# Patient Record
Sex: FEMALE | Race: WHITE | NOT HISPANIC OR LATINO | URBAN - METROPOLITAN AREA
[De-identification: names, ages, dates, MRNs, and addresses within clinical notes are randomized per-mention and may not be internally consistent; named-entity substitution may affect disease eponyms.]

---

## 2019-08-30 ENCOUNTER — OFFICE (OUTPATIENT)
Dept: URBAN - METROPOLITAN AREA CLINIC 33 | Facility: CLINIC | Age: 49
End: 2019-08-30

## 2019-08-30 VITALS
TEMPERATURE: 97.4 F | HEART RATE: 96 BPM | DIASTOLIC BLOOD PRESSURE: 91 MMHG | SYSTOLIC BLOOD PRESSURE: 112 MMHG | HEIGHT: 65 IN | WEIGHT: 122 LBS

## 2019-08-30 DIAGNOSIS — K59.09 OTHER CONSTIPATION: ICD-10-CM

## 2019-08-30 DIAGNOSIS — R10.31 RIGHT LOWER QUADRANT PAIN: ICD-10-CM

## 2019-08-30 DIAGNOSIS — R10.13 EPIGASTRIC PAIN: ICD-10-CM

## 2019-08-30 PROCEDURE — 99214 OFFICE O/P EST MOD 30 MIN: CPT

## 2019-08-30 RX ORDER — PANTOPRAZOLE SODIUM 40 MG/1
TABLET, DELAYED RELEASE ORAL
Qty: 30 | Refills: 5 | Status: COMPLETED
Start: 2019-08-30 | End: 2020-02-04

## 2019-08-30 NOTE — SERVICEHPINOTES
STEFANIA WATERMAN   is a   49  female who presents for abdominal pain. She has had chronic abd pain but states issues worsened last fall and progressed within the past month. She 2 types of pain: epigastric pain occurring postprandially, and RLQ pain that only occurs at night when lying down. Epigastric pain occurs after eating anything. No nausea (only with migraines). No dysphagia. EGD in 2015 with esophagitis, mild gastritis. No regular NSAIDs.BRRLQ pain is moreso "discomfort". She has issues with constipation--takes 2 stool softeners daily and sennakot regularly (every other day). No family hx of GI issues. She had positive celiac genetic testing in 2015 and has followed a strict GF diet. Colonoscopy 8/2016 with tortuous colon but bx neg for colitis. Denies blood in the stool. Chest CTA in May unremarkable.

## 2019-10-14 ENCOUNTER — OFFICE (OUTPATIENT)
Dept: URBAN - METROPOLITAN AREA CLINIC 33 | Facility: CLINIC | Age: 49
End: 2019-10-14

## 2019-10-14 VITALS
WEIGHT: 127.4 LBS | SYSTOLIC BLOOD PRESSURE: 116 MMHG | DIASTOLIC BLOOD PRESSURE: 94 MMHG | HEART RATE: 108 BPM | HEIGHT: 65 IN | TEMPERATURE: 97.3 F

## 2019-10-14 DIAGNOSIS — R10.13 EPIGASTRIC PAIN: ICD-10-CM

## 2019-10-14 DIAGNOSIS — R10.12 LEFT UPPER QUADRANT PAIN: ICD-10-CM

## 2019-10-14 DIAGNOSIS — R10.31 RIGHT LOWER QUADRANT PAIN: ICD-10-CM

## 2019-10-14 DIAGNOSIS — K59.09 OTHER CONSTIPATION: ICD-10-CM

## 2019-10-14 LAB
AMBIG ABBREV CMP14 DEFAULT: (no result)
AMYLASE: 85 U/L (ref 31–124)
CBC WITH DIFFERENTIAL/PLATELET: BASO (ABSOLUTE): 0.1 X10E3/UL (ref 0–0.2)
CBC WITH DIFFERENTIAL/PLATELET: BASOS: 1 %
CBC WITH DIFFERENTIAL/PLATELET: EOS (ABSOLUTE): 0.1 X10E3/UL (ref 0–0.4)
CBC WITH DIFFERENTIAL/PLATELET: EOS: 2 %
CBC WITH DIFFERENTIAL/PLATELET: HEMATOCRIT: 39.1 % (ref 34–46.6)
CBC WITH DIFFERENTIAL/PLATELET: HEMOGLOBIN: 12.8 G/DL (ref 11.1–15.9)
CBC WITH DIFFERENTIAL/PLATELET: IMMATURE GRANS (ABS): 0 X10E3/UL (ref 0–0.1)
CBC WITH DIFFERENTIAL/PLATELET: IMMATURE GRANULOCYTES: 0 %
CBC WITH DIFFERENTIAL/PLATELET: LYMPHS (ABSOLUTE): 1.5 X10E3/UL (ref 0.7–3.1)
CBC WITH DIFFERENTIAL/PLATELET: LYMPHS: 18 %
CBC WITH DIFFERENTIAL/PLATELET: MCH: 30.6 PG (ref 26.6–33)
CBC WITH DIFFERENTIAL/PLATELET: MCHC: 32.7 G/DL (ref 31.5–35.7)
CBC WITH DIFFERENTIAL/PLATELET: MCV: 94 FL (ref 79–97)
CBC WITH DIFFERENTIAL/PLATELET: MONOCYTES(ABSOLUTE): 0.5 X10E3/UL (ref 0.1–0.9)
CBC WITH DIFFERENTIAL/PLATELET: MONOCYTES: 6 %
CBC WITH DIFFERENTIAL/PLATELET: NEUTROPHILS (ABSOLUTE): 6.2 X10E3/UL (ref 1.4–7)
CBC WITH DIFFERENTIAL/PLATELET: NEUTROPHILS: 73 %
CBC WITH DIFFERENTIAL/PLATELET: PLATELETS: 202 X10E3/UL (ref 150–450)
CBC WITH DIFFERENTIAL/PLATELET: RBC: 4.18 X10E6/UL (ref 3.77–5.28)
CBC WITH DIFFERENTIAL/PLATELET: RDW: 13.5 % (ref 12.3–15.4)
CBC WITH DIFFERENTIAL/PLATELET: WBC: 8.4 X10E3/UL (ref 3.4–10.8)
COMP. METABOLIC PANEL (14): A/G RATIO: 1.7 (ref 1.2–2.2)
COMP. METABOLIC PANEL (14): ALBUMIN: 4 G/DL (ref 3.5–5.5)
COMP. METABOLIC PANEL (14): ALKALINE PHOSPHATASE: 47 IU/L (ref 39–117)
COMP. METABOLIC PANEL (14): ALT (SGPT): 8 IU/L (ref 0–32)
COMP. METABOLIC PANEL (14): AST (SGOT): 13 IU/L (ref 0–40)
COMP. METABOLIC PANEL (14): BILIRUBIN, TOTAL: <0.2 MG/DL
COMP. METABOLIC PANEL (14): BUN/CREATININE RATIO: 12 (ref 9–23)
COMP. METABOLIC PANEL (14): BUN: 12 MG/DL (ref 6–24)
COMP. METABOLIC PANEL (14): CALCIUM: 9.2 MG/DL (ref 8.7–10.2)
COMP. METABOLIC PANEL (14): CARBON DIOXIDE, TOTAL: 18 MMOL/L — LOW (ref 20–29)
COMP. METABOLIC PANEL (14): CHLORIDE: 105 MMOL/L (ref 96–106)
COMP. METABOLIC PANEL (14): CREATININE: 1.03 MG/DL — HIGH (ref 0.57–1)
COMP. METABOLIC PANEL (14): EGFR IF AFRICN AM: 75 ML/MIN/1.73 (ref 59–?)
COMP. METABOLIC PANEL (14): EGFR IF NONAFRICN AM: 65 ML/MIN/1.73 (ref 59–?)
COMP. METABOLIC PANEL (14): GLOBULIN, TOTAL: 2.4 G/DL (ref 1.5–4.5)
COMP. METABOLIC PANEL (14): GLUCOSE: 135 MG/DL — HIGH (ref 65–99)
COMP. METABOLIC PANEL (14): POTASSIUM: 4.1 MMOL/L (ref 3.5–5.2)
COMP. METABOLIC PANEL (14): PROTEIN, TOTAL: 6.4 G/DL (ref 6–8.5)
COMP. METABOLIC PANEL (14): SODIUM: 141 MMOL/L (ref 134–144)
LIPASE: 21 U/L (ref 14–72)

## 2019-10-14 PROCEDURE — 99215 OFFICE O/P EST HI 40 MIN: CPT

## 2019-10-14 RX ORDER — SUCRALFATE 1 G/1
TABLET ORAL
Qty: 90 | Refills: 5 | Status: COMPLETED
Start: 2019-10-14 | End: 2020-02-04

## 2019-10-14 NOTE — SERVICEHPINOTES
STEFANIA WATERMAN   is a   49  female who complains of ongoing abdominal pain.  She has had chronic abd pain but states issues worsened last fall and progressed within the few months. Since last OV, she states RLQ pain has worsened and is now severe. She is having issues sleeping at night due to the pain (was previously moreso a "discomfort". She also reports LUQ pain (previously was more epigastric). Both pains are unrelated to eating and BMs. She has been taking linzess 72mcg, usually every other day which works very well for her although has no impact on pain.  No regular NSAIDs. Chest CTA in May unremarkable. No recent abdominal imaging. No weight loss. She just recently lost her job. She has only been taking pantoprazole "occasionally" as she is concerned about SEs (kidney issues) with regular use. Colonoscopy 8/2016 with tortuous colon but bx neg for colitis EGD in 2015 with esophagitis, mild gastritis.BR

## 2020-02-04 ENCOUNTER — OFFICE (OUTPATIENT)
Dept: URBAN - METROPOLITAN AREA CLINIC 33 | Facility: CLINIC | Age: 50
End: 2020-02-04

## 2020-02-04 VITALS
WEIGHT: 129 LBS | HEART RATE: 98 BPM | TEMPERATURE: 97.7 F | DIASTOLIC BLOOD PRESSURE: 100 MMHG | HEIGHT: 65 IN | SYSTOLIC BLOOD PRESSURE: 125 MMHG

## 2020-02-04 DIAGNOSIS — R10.12 LEFT UPPER QUADRANT PAIN: ICD-10-CM

## 2020-02-04 DIAGNOSIS — K90.0 CELIAC DISEASE: ICD-10-CM

## 2020-02-04 DIAGNOSIS — R10.31 RIGHT LOWER QUADRANT PAIN: ICD-10-CM

## 2020-02-04 DIAGNOSIS — K59.09 OTHER CONSTIPATION: ICD-10-CM

## 2020-02-04 PROCEDURE — 99214 OFFICE O/P EST MOD 30 MIN: CPT

## 2020-02-04 RX ORDER — HYOSCYAMINE SULFATE 0.12 MG/1
TABLET ORAL; SUBLINGUAL
Qty: 30 | Refills: 3 | Status: ACTIVE
Start: 2020-02-04

## 2020-02-11 LAB
HEPATIC FUNCTION PANEL (7): ALBUMIN: 4.2 G/DL (ref 3.8–4.8)
HEPATIC FUNCTION PANEL (7): ALKALINE PHOSPHATASE: 41 IU/L (ref 39–117)
HEPATIC FUNCTION PANEL (7): ALT (SGPT): 13 IU/L (ref 0–32)
HEPATIC FUNCTION PANEL (7): AST (SGOT): 15 IU/L (ref 0–40)
HEPATIC FUNCTION PANEL (7): BILIRUBIN, DIRECT: 0.08 MG/DL (ref 0–0.4)
HEPATIC FUNCTION PANEL (7): BILIRUBIN, TOTAL: 0.2 MG/DL (ref 0–1.2)
HEPATIC FUNCTION PANEL (7): PROTEIN, TOTAL: 6.6 G/DL (ref 6–8.5)

## 2020-02-25 ENCOUNTER — OFFICE (OUTPATIENT)
Dept: URBAN - METROPOLITAN AREA CLINIC 32 | Facility: CLINIC | Age: 50
End: 2020-02-25
Payer: COMMERCIAL

## 2020-02-25 ENCOUNTER — OFFICE (OUTPATIENT)
Dept: URBAN - METROPOLITAN AREA PATHOLOGY 17 | Facility: PATHOLOGY | Age: 50
End: 2020-02-25
Payer: COMMERCIAL

## 2020-02-25 VITALS
HEART RATE: 84 BPM | DIASTOLIC BLOOD PRESSURE: 91 MMHG | OXYGEN SATURATION: 98 % | SYSTOLIC BLOOD PRESSURE: 131 MMHG | HEART RATE: 98 BPM | HEART RATE: 100 BPM | DIASTOLIC BLOOD PRESSURE: 93 MMHG | SYSTOLIC BLOOD PRESSURE: 143 MMHG | SYSTOLIC BLOOD PRESSURE: 132 MMHG | DIASTOLIC BLOOD PRESSURE: 86 MMHG | WEIGHT: 129 LBS | HEIGHT: 65 IN | RESPIRATION RATE: 15 BRPM | TEMPERATURE: 98.6 F | RESPIRATION RATE: 18 BRPM | RESPIRATION RATE: 14 BRPM | OXYGEN SATURATION: 100 % | TEMPERATURE: 98.1 F | SYSTOLIC BLOOD PRESSURE: 146 MMHG | HEART RATE: 91 BPM | OXYGEN SATURATION: 99 %

## 2020-02-25 DIAGNOSIS — R19.7 DIARRHEA, UNSPECIFIED: ICD-10-CM

## 2020-02-25 DIAGNOSIS — R10.13 EPIGASTRIC PAIN: ICD-10-CM

## 2020-02-25 DIAGNOSIS — K29.60 OTHER GASTRITIS WITHOUT BLEEDING: ICD-10-CM

## 2020-02-25 DIAGNOSIS — K21.0 GASTRO-ESOPHAGEAL REFLUX DISEASE WITH ESOPHAGITIS: ICD-10-CM

## 2020-02-25 PROBLEM — K44.9 DIAPHRAGMATIC HERNIA WITHOUT OBSTRUCTION OR GANGRENE: Status: ACTIVE | Noted: 2020-02-25

## 2020-02-25 PROBLEM — K31.89 OTHER DISEASES OF STOMACH AND DUODENUM: Status: ACTIVE | Noted: 2020-02-25

## 2020-02-25 PROCEDURE — 43239 EGD BIOPSY SINGLE/MULTIPLE: CPT

## 2020-02-25 PROCEDURE — 88305 TISSUE EXAM BY PATHOLOGIST: CPT

## 2020-02-25 PROCEDURE — 88313 SPECIAL STAINS GROUP 2: CPT

## 2020-02-25 PROCEDURE — 88342 IMHCHEM/IMCYTCHM 1ST ANTB: CPT
